# Patient Record
Sex: FEMALE | Race: WHITE | NOT HISPANIC OR LATINO | URBAN - METROPOLITAN AREA
[De-identification: names, ages, dates, MRNs, and addresses within clinical notes are randomized per-mention and may not be internally consistent; named-entity substitution may affect disease eponyms.]

---

## 2018-06-12 ENCOUNTER — POST-OPERATIVE VISIT (OUTPATIENT)
Dept: URBAN - METROPOLITAN AREA CLINIC 23 | Facility: CLINIC | Age: 83
End: 2018-06-12
Payer: COMMERCIAL

## 2018-06-12 DIAGNOSIS — H52.223 REGULAR ASTIGMATISM, BILATERAL: ICD-10-CM

## 2018-06-12 DIAGNOSIS — Z09 ENCNTR FOR F/U EXAM AFT TRTMT FOR COND OTH THAN MALIG NEOPLM: Primary | ICD-10-CM

## 2018-06-12 PROCEDURE — 92015 DETERMINE REFRACTIVE STATE: CPT | Performed by: OPTOMETRIST

## 2018-06-12 PROCEDURE — 99024 POSTOP FOLLOW-UP VISIT: CPT | Performed by: OPTOMETRIST

## 2018-06-12 ASSESSMENT — INTRAOCULAR PRESSURE
OD: 14
OS: 14

## 2018-06-12 ASSESSMENT — VISUAL ACUITY: OD: 20/30

## 2019-05-06 ENCOUNTER — OFFICE VISIT (OUTPATIENT)
Dept: URBAN - METROPOLITAN AREA CLINIC 23 | Facility: CLINIC | Age: 84
End: 2019-05-06
Payer: MEDICARE

## 2019-05-06 DIAGNOSIS — H25.12 AGE-RELATED NUCLEAR CATARACT, LEFT EYE: ICD-10-CM

## 2019-05-06 DIAGNOSIS — H26.491 OTHER SECONDARY CATARACT, RIGHT EYE: Primary | ICD-10-CM

## 2019-05-06 PROCEDURE — 92014 COMPRE OPH EXAM EST PT 1/>: CPT | Performed by: OPTOMETRIST

## 2019-05-06 ASSESSMENT — KERATOMETRY
OD: 41.75
OS: 41.50

## 2019-05-06 ASSESSMENT — INTRAOCULAR PRESSURE
OD: 14
OS: 14

## 2019-05-06 NOTE — IMPRESSION/PLAN
Impression: Central retinal vein occlusion, left eye, stable: G33.7279. OS. Plan: Discussed diagnosis in detail with patient. Reassured patient of current condition and treatment. Will continue to observe condition and or symptoms. CRVO unchanged and long standing.

## 2019-05-06 NOTE — IMPRESSION/PLAN
Impression: Other secondary cataract, right eye: H26.491. OD. Plan: PCO accounts for some of the patient's complaints. Patient understands changing glasses will not significantly improve vision. Patient willing to have YAG Capsulotomy.  Refer to cataract surgeon for YAG Capsulotomy evaluation for OD

## 2019-05-06 NOTE — IMPRESSION/PLAN
Impression: Age-related nuclear cataract, left eye: H25.12. OS. Plan: Discussed diagnosis in detail with patient. Cataracts affecting vision some, but no surgery is currently recommended. The patient will monitor vision changes and contact us with any decrease in vision. Continue to monitor.

## 2019-06-12 ENCOUNTER — SURGERY (OUTPATIENT)
Dept: URBAN - METROPOLITAN AREA SURGERY 11 | Facility: SURGERY | Age: 84
End: 2019-06-12
Payer: COMMERCIAL

## 2019-06-12 PROCEDURE — 66821 AFTER CATARACT LASER SURGERY: CPT | Performed by: OPHTHALMOLOGY

## 2019-06-28 ENCOUNTER — POST-OPERATIVE VISIT (OUTPATIENT)
Dept: URBAN - METROPOLITAN AREA CLINIC 23 | Facility: CLINIC | Age: 84
End: 2019-06-28

## 2019-06-28 PROCEDURE — 99024 POSTOP FOLLOW-UP VISIT: CPT | Performed by: OPTOMETRIST

## 2019-06-28 ASSESSMENT — INTRAOCULAR PRESSURE
OD: 14
OS: 14

## 2020-01-15 ENCOUNTER — OFFICE VISIT (OUTPATIENT)
Dept: URBAN - METROPOLITAN AREA CLINIC 23 | Facility: CLINIC | Age: 85
End: 2020-01-15
Payer: COMMERCIAL

## 2020-01-15 DIAGNOSIS — H34.8122 CENTRAL RETINAL VEIN OCCLUSION, LEFT EYE, STABLE: ICD-10-CM

## 2020-01-15 PROCEDURE — 99213 OFFICE O/P EST LOW 20 MIN: CPT | Performed by: OPHTHALMOLOGY

## 2020-01-15 ASSESSMENT — KERATOMETRY
OS: 41.88
OD: 42.00

## 2020-01-15 ASSESSMENT — INTRAOCULAR PRESSURE
OD: 14
OS: 13

## 2020-01-15 ASSESSMENT — VISUAL ACUITY: OD: 20/30

## 2020-01-15 NOTE — IMPRESSION/PLAN
Impression: Central retinal vein occlusion, left eye, stable: Q30.2145. OS. Plan: Discussed diagnosis in detail with patient. Reassured patient of current condition and treatment. Will continue to observe condition and or symptoms. CRVO unchanged and long standing.

## 2020-01-15 NOTE — IMPRESSION/PLAN
Impression: Age-related nuclear cataract, left eye: H25.12. Condition: established, worsening. Symptoms: may improve with surgery. Plan: Advised pt in detail that if we continue with surgery in OS it may see things brighter but the chances of improving vision would not limited due to long standing CRVO OS which is stable. Pt understands and would like to hold off on surgery OS.

## 2021-05-12 ENCOUNTER — OFFICE VISIT (OUTPATIENT)
Dept: URBAN - METROPOLITAN AREA CLINIC 23 | Facility: CLINIC | Age: 86
End: 2021-05-12
Payer: MEDICARE

## 2021-05-12 DIAGNOSIS — H35.3131 BILATERAL NONEXUDATIVE AGE-RELATED MACULAR DEGENERATION, EARLY DRY STAGE: Primary | ICD-10-CM

## 2021-05-12 DIAGNOSIS — H31.012 MACULA SCAR OF POST POLE OF LEFT EYE: ICD-10-CM

## 2021-05-12 PROCEDURE — 92014 COMPRE OPH EXAM EST PT 1/>: CPT | Performed by: OPTOMETRIST

## 2021-05-12 PROCEDURE — 92134 CPTRZ OPH DX IMG PST SGM RTA: CPT | Performed by: OPTOMETRIST

## 2021-05-12 ASSESSMENT — KERATOMETRY
OS: 42.00
OD: 42.38

## 2021-05-12 ASSESSMENT — INTRAOCULAR PRESSURE
OD: 16
OS: 12

## 2021-05-12 NOTE — IMPRESSION/PLAN
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 Plan: Discussed diagnosis in detail with patient. No treatment is required at this time. Will continue to observe condition and or symptoms. Reassured patient of current condition and treatment.

## 2021-05-12 NOTE — IMPRESSION/PLAN
Impression: Age-related nuclear cataract, left eye: H25.12 OS. Plan: Discussed diagnosis in detail with patient. Cataracts affecting vision some, but no surgery is currently recommended. The patient will monitor vision changes and contact us with any decrease in vision. Continue to monitor.